# Patient Record
Sex: MALE
[De-identification: names, ages, dates, MRNs, and addresses within clinical notes are randomized per-mention and may not be internally consistent; named-entity substitution may affect disease eponyms.]

---

## 2018-03-21 ENCOUNTER — HOSPITAL ENCOUNTER (EMERGENCY)
Dept: HOSPITAL 89 - ER | Age: 58
Discharge: HOME | End: 2018-03-21
Payer: COMMERCIAL

## 2018-03-21 VITALS — DIASTOLIC BLOOD PRESSURE: 93 MMHG | SYSTOLIC BLOOD PRESSURE: 126 MMHG

## 2018-03-21 DIAGNOSIS — R07.9: ICD-10-CM

## 2018-03-21 DIAGNOSIS — K22.4: Primary | ICD-10-CM

## 2018-03-21 LAB — PLATELET COUNT, AUTOMATED: 247 K/UL (ref 150–450)

## 2018-03-21 PROCEDURE — 82247 BILIRUBIN TOTAL: CPT

## 2018-03-21 PROCEDURE — 82947 ASSAY GLUCOSE BLOOD QUANT: CPT

## 2018-03-21 PROCEDURE — 84132 ASSAY OF SERUM POTASSIUM: CPT

## 2018-03-21 PROCEDURE — 82565 ASSAY OF CREATININE: CPT

## 2018-03-21 PROCEDURE — 82374 ASSAY BLOOD CARBON DIOXIDE: CPT

## 2018-03-21 PROCEDURE — 82310 ASSAY OF CALCIUM: CPT

## 2018-03-21 PROCEDURE — 84450 TRANSFERASE (AST) (SGOT): CPT

## 2018-03-21 PROCEDURE — 93005 ELECTROCARDIOGRAM TRACING: CPT

## 2018-03-21 PROCEDURE — 84295 ASSAY OF SERUM SODIUM: CPT

## 2018-03-21 PROCEDURE — 84520 ASSAY OF UREA NITROGEN: CPT

## 2018-03-21 PROCEDURE — 82040 ASSAY OF SERUM ALBUMIN: CPT

## 2018-03-21 PROCEDURE — 85025 COMPLETE CBC W/AUTO DIFF WBC: CPT

## 2018-03-21 PROCEDURE — 99284 EMERGENCY DEPT VISIT MOD MDM: CPT

## 2018-03-21 PROCEDURE — 84075 ASSAY ALKALINE PHOSPHATASE: CPT

## 2018-03-21 PROCEDURE — 84460 ALANINE AMINO (ALT) (SGPT): CPT

## 2018-03-21 PROCEDURE — 84155 ASSAY OF PROTEIN SERUM: CPT

## 2018-03-21 PROCEDURE — 84484 ASSAY OF TROPONIN QUANT: CPT

## 2018-03-21 PROCEDURE — 82435 ASSAY OF BLOOD CHLORIDE: CPT

## 2018-03-21 PROCEDURE — 71046 X-RAY EXAM CHEST 2 VIEWS: CPT

## 2018-03-21 NOTE — EKG
FACILITY: [facility] 

 

PATIENT NAME: ALEJANDRA DE JESUS

: [id number]

MR: [id number]

V: [id number]

EXAM DATE: 

ORDERING PHYSICIAN: SHEEBA BOBBY

TECHNOLOGIST: BITNER

 

Test Reason : CHEST PAIN

Blood Pressure : ***/*** mmHG

Vent. Rate : 081 BPM     Atrial Rate : 081 BPM

   P-R Int : 152 ms          QRS Dur : 084 ms

    QT Int : 372 ms       P-R-T Axes : 039 033 022 degrees

   QTc Int : 432 ms

 

Sinus rhythm

Nonspecific ST findings lateral leads

No previous ECGs available

Confirmed by QIANA WRIGHT ([id number]) on 3/22/2018 5:38:49 AM

 

Referred By:  KANU           Confirmed By:QIANA WRIGHT

## 2018-03-21 NOTE — RADIOLOGY IMAGING REPORT
FACILITY: [facility] 

 

PATIENT NAME: Jacob Davis

: 1960

MR: [id number]

V: [id number]

EXAM DATE: 

ORDERING PHYSICIAN: SHEEBA BOBBY

TECHNOLOGIST: 

 

Location: 

Patient: Jacob Davis

: 1960

MRN: [id number]

Visit/Account:[id number]

Date of Sevice:  3/21/2018

 

ACCESSION #: [id number]

 

CHEST PA AND LAT

 

COMPARISONS: None.

 

ADDITIONAL PERTINENT HISTORY: Chest pain

 

FINDINGS:

Cardiomediastinal silhouette:  Negative.

 

Pulmonary vasculature:  Negative.

 

Lung fields:  Negative.

 

Pleural spaces: Negative.

 

Osseous structures:  Negative.

 

Surrounding soft tissues:  Negative.

 

 

IMPRESSION: No evidence of acute cardiopulmonary disease.

 

Report Dictated By: Tacos Anderson MD at 3/21/2018 4:25 PM

 

Report E-Signed By: Tacos Anderson MD  at 3/21/2018 4:26 PM

 

WSN:[id number]